# Patient Record
Sex: FEMALE | Race: AMERICAN INDIAN OR ALASKA NATIVE
[De-identification: names, ages, dates, MRNs, and addresses within clinical notes are randomized per-mention and may not be internally consistent; named-entity substitution may affect disease eponyms.]

---

## 2018-07-20 ENCOUNTER — HOSPITAL ENCOUNTER (EMERGENCY)
Dept: HOSPITAL 5 - ED | Age: 48
Discharge: HOME | End: 2018-07-20
Payer: COMMERCIAL

## 2018-07-20 VITALS — SYSTOLIC BLOOD PRESSURE: 158 MMHG | DIASTOLIC BLOOD PRESSURE: 91 MMHG

## 2018-07-20 DIAGNOSIS — K02.9: Primary | ICD-10-CM

## 2018-07-20 DIAGNOSIS — Z90.710: ICD-10-CM

## 2018-07-20 DIAGNOSIS — K05.00: ICD-10-CM

## 2018-07-20 PROCEDURE — 99282 EMERGENCY DEPT VISIT SF MDM: CPT

## 2018-07-20 NOTE — EMERGENCY DEPARTMENT REPORT
ED ENT HPI





- General


Chief complaint: Dental/Oral


Stated complaint: FEELING FAINT/ MOUTH PAIN


Time Seen by Provider: 18 12:24


Source: patient, family


Mode of arrival: Ambulatory


Limitations: No Limitations





- History of Present Illness


Initial comments: 





This is for 48-year-old female reports that he has a history of dental 

abscesses.  She said he saw Dr. Damian who is a dentist in [place].  She 

said she has an appointment on  for root canal.  She said she has been 

on 2 rounds of antibiotic and is currently on amoxicillin and Motrin 

prescription strength and that the pain medications dictation isn't helping. 

She said the pain is getting worse it is 10/10 and achy and worse with talking 

and eating.  No alleviating factors.  Denies any medical problems denies any 

nausea vomiting, drooling, nasal congestion, cough, shortness of breath or 

chest pain.


MD complaint: tooth pain


Onset/Timin


-: Gradual


Location: tooth # (left and right upper and lower back tooth.)


Severity: severe


Severity scale (0 -10): 10


Quality: aching


Consistency: constant


Improves with: none


Worsens with: eating


Context- Dental: history of dental caries, poor dental care


Associated Symptoms: toothache.  denies: fever, cough, gum swelling, pain with 

swallowing, sore throat, tinnitus, discharge from ear, rhinorrhea





- Related Data


 Previous Rx's











 Medication  Instructions  Recorded  Last Taken  Type


 


Acetaminophen/Codeine [Tylenol 1 tab PO Q6H PRN #12 tab 18 Unknown Rx





/Codeine # 3 tab]    


 


Ibuprofen [Motrin] 600 mg PO Q8H PRN #21 tablet 18 Unknown Rx











 Allergies











Allergy/AdvReac Type Severity Reaction Status Date / Time


 


No Known Allergies Allergy   Unverified 18 12:05














ED Dental HPI





- General


Chief complaint: Dental/Oral


Stated complaint: FEELING FAINT/ MOUTH PAIN


Time Seen by Provider: 18 12:24


Source: patient


Mode of arrival: Ambulatory


Limitations: No Limitations





- Related Data


 Previous Rx's











 Medication  Instructions  Recorded  Last Taken  Type


 


Acetaminophen/Codeine [Tylenol 1 tab PO Q6H PRN #12 tab 18 Unknown Rx





/Codeine # 3 tab]    


 


Ibuprofen [Motrin] 600 mg PO Q8H PRN #21 tablet 18 Unknown Rx











 Allergies











Allergy/AdvReac Type Severity Reaction Status Date / Time


 


No Known Allergies Allergy   Unverified 18 12:05














ED Review of Systems


ROS: 


Stated complaint: FEELING FAINT/ MOUTH PAIN


Other details as noted in HPI





Constitutional: denies: chills, fever


Eyes: denies: eye pain, eye discharge, vision change


ENT: dental pain.  denies: ear pain, throat pain, congestion


Respiratory: denies: cough, shortness of breath, SOB with exertion, SOB at rest

, wheezing


Cardiovascular: denies: chest pain, palpitations, edema, syncope


Gastrointestinal: denies: nausea, vomiting


Musculoskeletal: denies: back pain, joint swelling, arthralgia


Skin: denies: rash, lesions


Neurological: denies: headache





ED Past Medical Hx





- Past Medical History


Previous Medical History?: No





- Surgical History


Past Surgical History?: No


Additional Surgical History: Hysterectomy





- Family History


Family history: no significant





- Social History


Smoking Status: Never Smoker


Substance Use Type: None





- Medications


Home Medications: 


 Home Medications











 Medication  Instructions  Recorded  Confirmed  Last Taken  Type


 


Acetaminophen/Codeine [Tylenol 1 tab PO Q6H PRN #12 tab 18  Unknown Rx





/Codeine # 3 tab]     


 


Ibuprofen [Motrin] 600 mg PO Q8H PRN #21 tablet 18  Unknown Rx














ED Physical Exam





- General


Limitations: No Limitations


General appearance: alert, in no apparent distress





- Head


Head exam: Present: atraumatic, normocephalic, normal inspection





- Eye


Eye exam: Present: normal appearance, PERRL, EOMI


Pupils: Present: normal accommodation





- ENT


ENT exam: Present: normal orophraynx, mucous membranes moist, TM's normal 

bilaterally, normal external ear exam





- Expanded ENT Exam


  ** Expanded


Mouth exam: Present: normal external inspection


Teeth exam: Present: dental caries, dental tenderness # (tender to palpate to 

tooth #1234 number 12,13,14 15,16 #32 31,33,29 and #17 18,19,20), gingival 

enlargement.  Absent: normal inspection, fractured tooth #





  __________________________














  __________________________





 1 - Dental Tenderness (dental tenderness), Other (dental caries)





 2 - Dental Tenderness (dental tenderness and caries)





 3 - Dental Tenderness (tenderness)





 4 - Dental Tenderness (dental tenderness), Other (caries)





Throat exam: Positive: normal inspection





- Neck


Neck exam: Present: normal inspection, full ROM.  Absent: tenderness, 

meningismus, lymphadenopathy





- Respiratory


Respiratory exam: Present: normal lung sounds bilaterally.  Absent: respiratory 

distress, chest wall tenderness





- Cardiovascular


Cardiovascular Exam: Present: regular rate, normal rhythm, normal heart sounds, 

rubs.  Absent: systolic murmur, diastolic murmur





- Extremities Exam


Extremities exam: Present: normal inspection, full ROM, normal capillary 

refill.  Absent: tenderness, pedal edema, joint swelling, calf tenderness





- Neurological Exam


Neurological exam: Present: alert, oriented X3, normal gait





- Psychiatric


Psychiatric exam: Present: normal affect, normal mood





- Skin


Skin exam: Present: warm, dry, intact, normal color.  Absent: rash





ED Course


 Vital Signs











  18





  12:01


 


Temperature 98.9 F


 


Pulse Rate 88


 


Respiratory 18





Rate 


 


Blood Pressure 158/91


 


O2 Sat by Pulse 98





Oximetry 














- Reevaluation(s)


Reevaluation #1: 





18 13:46


 given Percocet 5/325 mg 2 tablets by mouth for her toothache which she said 

her pain is now down to 2/10.  She had no adverse reaction from medication











ED Medical Decision Making





- Medical Decision Making





This is a 48-year-old female presented to the emergency room with toothache  

right upper and left upper and lower mouth.  He reports that she has an 

appointment with her dentist on 2018 and she is on ibuprofen and 

amoxicillin but she is still in pain.  She is here to be treated.





I saw patient in physical exam reports oral mucosa is moist, no pharyngeal 

erythema or exudate, uvula is midline, tongue is normal and oral airways 

patent.  Patient with dental caries, dental tenderness and gingivitis.  Caries 

and tenderness located to right and left upper and lower teeth.  She does not 

have any sign of abscess or cellulitis.  No facial swelling seen.  Patient was 

given pain medication and emergency room which helped her pain.  Severity 

diagnosis and treatment plan and I told her she'll need to go to the dentist 

for evaluation and to treat underlying problems that associated with her 

toothache.  She voiced understanding.





Assessment and plan


Toothache-pain is better with Percocet 5/325 2 tablets by mouth and emergency 

room.  She will be discharged home on Tylenol No. 3 and ibuprofen.


Gingivitis-patient has appointment on 2018 for root canal.  I discussed 

with her she needs to continue her antibiotic.


Dental caries-scheduled for root canal and she is on antibiotic.





I educated patient on oral care, medication, treatment plan, diagnosis and need 

to follow-up for a dental procedure and she voiced understanding.





Pt discharged home with her family in stable condition.  Her vital signs are 

stable she is afebrile and her pain is better.  Discharged home to follow-up 

with her dentist as scheduled with prescription for Tylenol 3 and Motrin.








- Differential Diagnosis


tooth abscess, FX tooth, ginivitis,caries, pharyngitis, sinusitis, tootahe


Critical care attestation.: 


If time is entered above; I have spent that time in minutes in the direct care 

of this critically ill patient, excluding procedure time.








ED Disposition


Clinical Impression: 


 Dental cavities, Toothache, Gingivitis





Disposition:  TO HOME OR SELFCARE


Is pt being admited?: No


Does the pt Need Aspirin: No


Condition: Stable


Instructions:  Dental Caries (ED), Gingivitis (ED), Toothache (ED)


Additional Instructions: 


Please follow up with dentist as discussed.  See alternative dentist and 

discharge instruction paperwork if needed.


Take Motrin for mild to moderate pain and please take this medication with 

food.  Take Tylenol No. 3 for severe pain and please do not drive or operate 

heavy machinery while taking this medication.


Continue to  take amoxicillin as prescribed by your dentist


 keep dental appointment for 2018 with your dentist.


Please see floss twice daily


Gargle Peridex mouthwash twice daily


Prescriptions: 


Acetaminophen/Codeine [Tylenol /Codeine # 3 tab] 1 tab PO Q6H PRN #12 tab


 PRN Reason: severe pain


Ibuprofen [Motrin] 600 mg PO Q8H PRN #21 tablet


 PRN Reason: mild to moderate pain


Referrals: 


PRIMARY CARE,MD [Primary Care Provider] - 18


your dentist ,Dr. Damian [Other] - 18


Forms:  Accompanied Note, Work/School Release Form(ED)